# Patient Record
Sex: MALE | Race: OTHER
[De-identification: names, ages, dates, MRNs, and addresses within clinical notes are randomized per-mention and may not be internally consistent; named-entity substitution may affect disease eponyms.]

---

## 2018-07-13 ENCOUNTER — HOSPITAL ENCOUNTER (EMERGENCY)
Dept: HOSPITAL 41 - JD.ED | Age: 43
Discharge: HOME | End: 2018-07-13
Payer: MEDICAID

## 2018-07-13 DIAGNOSIS — F17.210: ICD-10-CM

## 2018-07-13 DIAGNOSIS — F41.9: Primary | ICD-10-CM
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
## 2018-07-16 NOTE — CR
Chest: Two views of the chest were obtained.

 

Comparison: No prior chest x-ray.

 

Heart size and mediastinum are normal.  Lungs are clear.  Bony 

structures are unremarkable.  Surgical clips are seen in the upper 

abdomen.

 

Impression:

1.  Nothing acute is seen on two-view chest x-ray.

 

Diagnostic code #1

## 2018-08-27 ENCOUNTER — HOSPITAL ENCOUNTER (EMERGENCY)
Facility: HOSPITAL | Age: 43
Discharge: HOME | End: 2018-08-27
Attending: FAMILY MEDICINE
Payer: COMMERCIAL

## 2018-08-27 VITALS
HEIGHT: 69 IN | HEART RATE: 77 BPM | RESPIRATION RATE: 16 BRPM | BODY MASS INDEX: 26.66 KG/M2 | OXYGEN SATURATION: 97 % | SYSTOLIC BLOOD PRESSURE: 130 MMHG | WEIGHT: 180 LBS | DIASTOLIC BLOOD PRESSURE: 81 MMHG | TEMPERATURE: 97.7 F

## 2018-08-27 DIAGNOSIS — R07.89 OTHER CHEST PAIN: ICD-10-CM

## 2018-08-27 DIAGNOSIS — R42 DIZZINESS: Primary | ICD-10-CM

## 2018-08-27 LAB
ALBUMIN SERPL-MCNC: 4.3 G/DL (ref 3.5–5.3)
ALP SERPL-CCNC: 104 U/L (ref 45–115)
ALT SERPL-CCNC: 40 U/L (ref 0–52)
ANION GAP SERPL CALC-SCNC: 7 MMOL/L (ref 3–11)
AST SERPL-CCNC: 17 U/L (ref 0–39)
BILIRUB SERPL-MCNC: 0.45 MG/DL (ref 0–1.4)
BUN SERPL-MCNC: 16 MG/DL (ref 7–25)
CALCIUM ALBUM COR SERPL-MCNC: 9.3 MG/DL (ref 8.5–10.1)
CALCIUM SERPL-MCNC: 9.5 MG/DL (ref 8.6–10.3)
CHLORIDE SERPL-SCNC: 101 MMOL/L (ref 98–107)
CO2 SERPL-SCNC: 27 MMOL/L (ref 21–32)
CREAT SERPL-MCNC: 1.2 MG/DL (ref 0.8–1.3)
ERYTHROCYTE [DISTWIDTH] IN BLOOD BY AUTOMATED COUNT: 14.6 % (ref 11.5–15)
GFR SERPL CREATININE-BSD FRML MDRD: 66 ML/MIN/1.73M*2
GLUCOSE SERPL-MCNC: 146 MG/DL (ref 70–105)
HCT VFR BLD AUTO: 45.2 % (ref 38–50)
HGB BLD-MCNC: 15.3 G/DL (ref 13.2–17.2)
MCH RBC QN AUTO: 28.7 PG (ref 29–34)
MCHC RBC AUTO-ENTMCNC: 33.9 G/DL (ref 32–36)
MCV RBC AUTO: 84.8 FL (ref 82–97)
PLATELET # BLD AUTO: 262 10*3/UL (ref 130–350)
PMV BLD AUTO: 8.2 FL (ref 6.9–10.8)
POTASSIUM SERPL-SCNC: 3.5 MMOL/L (ref 3.5–5.1)
PROT SERPL-MCNC: 6.8 G/DL (ref 6–8.3)
RBC # BLD AUTO: 5.33 10*6/ΜL (ref 4.1–5.8)
SODIUM SERPL-SCNC: 135 MMOL/L (ref 135–145)
TROPONIN I SERPL-MCNC: <0.03 NG/ML
WBC # BLD AUTO: 8.6 10*3/UL (ref 3.7–9.6)

## 2018-08-27 PROCEDURE — 36415 COLL VENOUS BLD VENIPUNCTURE: CPT | Performed by: FAMILY MEDICINE

## 2018-08-27 PROCEDURE — 99283 EMERGENCY DEPT VISIT LOW MDM: CPT | Performed by: FAMILY MEDICINE

## 2018-08-27 PROCEDURE — 84484 ASSAY OF TROPONIN QUANT: CPT | Performed by: FAMILY MEDICINE

## 2018-08-27 PROCEDURE — 99284 EMERGENCY DEPT VISIT MOD MDM: CPT | Performed by: FAMILY MEDICINE

## 2018-08-27 PROCEDURE — 93010 ELECTROCARDIOGRAM REPORT: CPT | Mod: NC | Performed by: PREVENTIVE MEDICINE

## 2018-08-27 PROCEDURE — 93005 ELECTROCARDIOGRAM TRACING: CPT | Performed by: FAMILY MEDICINE

## 2018-08-27 PROCEDURE — 6370000100 HC RX 637 (ALT 250 FOR IP): Performed by: FAMILY MEDICINE

## 2018-08-27 PROCEDURE — 85027 COMPLETE CBC AUTOMATED: CPT | Performed by: FAMILY MEDICINE

## 2018-08-27 PROCEDURE — 80053 COMPREHEN METABOLIC PANEL: CPT | Performed by: FAMILY MEDICINE

## 2018-08-27 PROCEDURE — 2500000200 HC RX 250 WO HCPCS

## 2018-08-27 RX ORDER — MECLIZINE HYDROCHLORIDE 25 MG/1
25 TABLET ORAL ONCE
Status: COMPLETED | OUTPATIENT
Start: 2018-08-27 | End: 2018-08-27

## 2018-08-27 RX ORDER — MECLIZINE HYDROCHLORIDE 25 MG/1
25 TABLET ORAL 3 TIMES DAILY PRN
Qty: 30 TABLET | Refills: 0 | Status: SHIPPED | OUTPATIENT
Start: 2018-08-27 | End: 2018-09-06

## 2018-08-27 RX ORDER — ONDANSETRON 4 MG/1
TABLET, ORALLY DISINTEGRATING ORAL
Status: COMPLETED
Start: 2018-08-27 | End: 2018-08-27

## 2018-08-27 RX ORDER — ONDANSETRON 4 MG/1
4 TABLET, ORALLY DISINTEGRATING ORAL ONCE
Status: COMPLETED | OUTPATIENT
Start: 2018-08-27 | End: 2018-08-27

## 2018-08-27 RX ADMIN — ONDANSETRON 4 MG: 4 TABLET, ORALLY DISINTEGRATING ORAL at 06:11

## 2018-08-27 RX ADMIN — MECLIZINE HYDROCHLORIDE 25 MG: 25 TABLET ORAL at 06:49

## 2018-08-27 ASSESSMENT — ENCOUNTER SYMPTOMS
DIARRHEA: 0
NUMBNESS: 0
FATIGUE: 0
WEAKNESS: 1
LIGHT-HEADEDNESS: 1
ACTIVITY CHANGE: 0
SPEECH DIFFICULTY: 0
COUGH: 0
DIZZINESS: 1
CHEST TIGHTNESS: 1
SORE THROAT: 0
NECK PAIN: 0
HEADACHES: 0
CHILLS: 0
NAUSEA: 1
ABDOMINAL PAIN: 0
APPETITE CHANGE: 0
VOMITING: 0
DIFFICULTY URINATING: 0
FEVER: 0
SHORTNESS OF BREATH: 0
NERVOUS/ANXIOUS: 1

## 2018-08-27 ASSESSMENT — PAIN DESCRIPTION - DESCRIPTORS: DESCRIPTORS: PRESSURE
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
## 2018-12-24 ENCOUNTER — HOSPITAL ENCOUNTER (EMERGENCY)
Facility: HOSPITAL | Age: 43
Discharge: HOME | End: 2018-12-24
Attending: PHYSICIAN ASSISTANT | Admitting: PHYSICIAN ASSISTANT
Payer: COMMERCIAL

## 2018-12-24 ENCOUNTER — APPOINTMENT (OUTPATIENT)
Dept: RADIOLOGY | Facility: HOSPITAL | Age: 43
End: 2018-12-24
Attending: PHYSICIAN ASSISTANT
Payer: COMMERCIAL

## 2018-12-24 ENCOUNTER — HOSPITAL ENCOUNTER (OUTPATIENT)
Facility: HOSPITAL | Age: 43
Discharge: ANOTHER HOSPITAL | End: 2018-12-24
Payer: COMMERCIAL

## 2018-12-24 VITALS
RESPIRATION RATE: 18 BRPM | SYSTOLIC BLOOD PRESSURE: 130 MMHG | TEMPERATURE: 97.7 F | BODY MASS INDEX: 28.51 KG/M2 | HEART RATE: 86 BPM | HEIGHT: 69 IN | DIASTOLIC BLOOD PRESSURE: 84 MMHG | OXYGEN SATURATION: 93 % | WEIGHT: 192.46 LBS

## 2018-12-24 DIAGNOSIS — K21.9 GERD (GASTROESOPHAGEAL REFLUX DISEASE): ICD-10-CM

## 2018-12-24 DIAGNOSIS — R10.13 EPIGASTRIC PAIN: Primary | ICD-10-CM

## 2018-12-24 LAB
ALBUMIN SERPL-MCNC: 4 G/DL (ref 3.5–5.3)
ALP SERPL-CCNC: 93 U/L (ref 45–115)
ALT SERPL-CCNC: 44 U/L (ref 0–52)
ANION GAP SERPL CALC-SCNC: 8 MMOL/L (ref 3–11)
AST SERPL-CCNC: 24 U/L (ref 0–39)
BASOPHILS # BLD AUTO: 0.1 10*3/UL
BASOPHILS NFR BLD AUTO: 1 % (ref 0–2)
BILIRUB SERPL-MCNC: 0.5 MG/DL (ref 0–1.4)
BUN SERPL-MCNC: 19 MG/DL (ref 7–25)
CALCIUM ALBUM COR SERPL-MCNC: 9.1 MG/DL (ref 8.6–10.3)
CALCIUM SERPL-MCNC: 9.1 MG/DL (ref 8.6–10.3)
CHLORIDE SERPL-SCNC: 104 MMOL/L (ref 98–107)
CO2 SERPL-SCNC: 24 MMOL/L (ref 21–32)
CREAT SERPL-MCNC: 0.86 MG/DL (ref 0.7–1.3)
EOSINOPHIL # BLD AUTO: 0.1 10*3/UL
EOSINOPHIL NFR BLD AUTO: 2 % (ref 0–3)
ERYTHROCYTE [DISTWIDTH] IN BLOOD BY AUTOMATED COUNT: 14.5 % (ref 11.5–15)
ETHANOL SERPL-MCNC: <10 MG/DL (ref 0–10)
GFR SERPL CREATININE-BSD FRML MDRD: 105 ML/MIN/1.73M*2
GLUCOSE SERPL-MCNC: 143 MG/DL (ref 70–105)
HCT VFR BLD AUTO: 45.3 % (ref 38–50)
HGB BLD-MCNC: 15.6 G/DL (ref 13.2–17.2)
LYMPHOCYTES # BLD AUTO: 1.9 10*3/UL
LYMPHOCYTES NFR BLD AUTO: 25 % (ref 15–47)
MCH RBC QN AUTO: 29.3 PG (ref 29–34)
MCHC RBC AUTO-ENTMCNC: 34.5 G/DL (ref 32–36)
MCV RBC AUTO: 84.9 FL (ref 82–97)
MONOCYTES # BLD AUTO: 0.7 10*3/UL
MONOCYTES NFR BLD AUTO: 9 % (ref 5–13)
NEUTROPHILS # BLD AUTO: 4.8 10*3/UL
NEUTROPHILS NFR BLD AUTO: 63 % (ref 46–70)
PLATELET # BLD AUTO: 236 10*3/UL (ref 130–350)
PMV BLD AUTO: 8.2 FL (ref 6.9–10.8)
POTASSIUM SERPL-SCNC: 3.1 MMOL/L (ref 3.5–5.1)
PROT SERPL-MCNC: 6.8 G/DL (ref 6–8.3)
RBC # BLD AUTO: 5.33 10*6/ΜL (ref 4.1–5.8)
SODIUM SERPL-SCNC: 136 MMOL/L (ref 135–145)
TROPONIN I SERPL-MCNC: <0.03 NG/ML
WBC # BLD AUTO: 7.6 10*3/UL (ref 3.7–9.6)

## 2018-12-24 PROCEDURE — 2500000200 HC RX 250 WO HCPCS: Performed by: PHYSICIAN ASSISTANT

## 2018-12-24 PROCEDURE — 84484 ASSAY OF TROPONIN QUANT: CPT | Performed by: PHYSICIAN ASSISTANT

## 2018-12-24 PROCEDURE — 99283 EMERGENCY DEPT VISIT LOW MDM: CPT | Performed by: PHYSICIAN ASSISTANT

## 2018-12-24 PROCEDURE — 99285 EMERGENCY DEPT VISIT HI MDM: CPT | Performed by: PHYSICIAN ASSISTANT

## 2018-12-24 PROCEDURE — 96374 THER/PROPH/DIAG INJ IV PUSH: CPT

## 2018-12-24 PROCEDURE — A0390 ADVANCED LIFE SUPPORT MILEAG: HCPCS | Mod: SH,QN

## 2018-12-24 PROCEDURE — 93010 ELECTROCARDIOGRAM REPORT: CPT | Performed by: FAMILY MEDICINE

## 2018-12-24 PROCEDURE — 99283 EMERGENCY DEPT VISIT LOW MDM: CPT | Mod: GF | Performed by: PHYSICIAN ASSISTANT

## 2018-12-24 PROCEDURE — 85025 COMPLETE CBC W/AUTO DIFF WBC: CPT | Performed by: PHYSICIAN ASSISTANT

## 2018-12-24 PROCEDURE — A0427 ALS1-EMERGENCY: HCPCS | Mod: SH,QN

## 2018-12-24 PROCEDURE — 2590000100 HC RX 259: Performed by: PHYSICIAN ASSISTANT

## 2018-12-24 PROCEDURE — 6360000200 HC RX 636 W HCPCS (ALT 250 FOR IP): Performed by: PHYSICIAN ASSISTANT

## 2018-12-24 PROCEDURE — 36415 COLL VENOUS BLD VENIPUNCTURE: CPT | Performed by: PHYSICIAN ASSISTANT

## 2018-12-24 PROCEDURE — G0480 DRUG TEST DEF 1-7 CLASSES: HCPCS | Performed by: PHYSICIAN ASSISTANT

## 2018-12-24 PROCEDURE — 93005 ELECTROCARDIOGRAM TRACING: CPT | Performed by: PHYSICIAN ASSISTANT

## 2018-12-24 PROCEDURE — 80320 DRUG SCREEN QUANTALCOHOLS: CPT | Performed by: PHYSICIAN ASSISTANT

## 2018-12-24 PROCEDURE — 80053 COMPREHEN METABOLIC PANEL: CPT | Performed by: PHYSICIAN ASSISTANT

## 2018-12-24 PROCEDURE — 71045 X-RAY EXAM CHEST 1 VIEW: CPT

## 2018-12-24 RX ORDER — ONDANSETRON HYDROCHLORIDE 2 MG/ML
4 INJECTION, SOLUTION INTRAVENOUS ONCE
Status: COMPLETED | OUTPATIENT
Start: 2018-12-24 | End: 2018-12-24

## 2018-12-24 RX ORDER — NAPROXEN SODIUM 220 MG/1
TABLET, FILM COATED ORAL
Status: DISCONTINUED
Start: 2018-12-24 | End: 2018-12-24 | Stop reason: HOSPADM

## 2018-12-24 RX ORDER — NAPROXEN SODIUM 220 MG/1
324 TABLET, FILM COATED ORAL ONCE
Status: COMPLETED | OUTPATIENT
Start: 2018-12-24 | End: 2018-12-24

## 2018-12-24 RX ORDER — ONDANSETRON HYDROCHLORIDE 2 MG/ML
INJECTION, SOLUTION INTRAVENOUS
Status: DISCONTINUED
Start: 2018-12-24 | End: 2018-12-24 | Stop reason: HOSPADM

## 2018-12-24 RX ORDER — ALUMINUM HYDROXIDE, MAGNESIUM HYDROXIDE, AND SIMETHICONE 1200; 120; 1200 MG/30ML; MG/30ML; MG/30ML
SUSPENSION ORAL
Status: DISCONTINUED
Start: 2018-12-24 | End: 2018-12-24 | Stop reason: HOSPADM

## 2018-12-24 RX ADMIN — NAPROXEN SODIUM 324 MG: 220 TABLET, FILM COATED ORAL at 01:30

## 2018-12-24 RX ADMIN — LIDOCAINE HYDROCHLORIDE 45 ML: 20 SOLUTION ORAL; TOPICAL at 01:35

## 2018-12-24 RX ADMIN — ONDANSETRON 4 MG: 2 INJECTION INTRAMUSCULAR; INTRAVENOUS at 01:35

## 2018-12-24 ASSESSMENT — PAIN DESCRIPTION - DESCRIPTORS
DESCRIPTORS: SHARP
DESCRIPTORS: SHARP

## 2018-12-24 ASSESSMENT — ENCOUNTER SYMPTOMS
NEUROLOGICAL NEGATIVE: 1
MUSCULOSKELETAL NEGATIVE: 1
COUGH: 1

## 2018-12-24 NOTE — DISCHARGE INSTRUCTIONS
Use omeprazole and TUMs over the counter for epigastric pain  Follow up with primary care provider in home town to recheck today's complaints  If condition persists or worsens to return to closest ER.
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
## 2018-12-24 NOTE — ED PROVIDER NOTES
HPI:  Chief Complaint   Patient presents with   • Chest Pain       This is a 43-year-old male who was brought in by EMS after waking up complaining of epigastric/ abdominal pain without any radiation to the back or arms .  This pain started at approximately 0045 and call the ambulance..  Patient said he just finished a course of amoxicillin for H. pylori which took 21 days.  Patient said he is has a little bit of nausea but no vomiting.  Patient said he was sleeping when the pain woke him up.  Patient denies any diabetes, hypertension, smoker or cardiac disease.  Patient is traveling through on his way to [facility] to see his family over the holidays.  Patient's last meal was earlier this evening where he had a slice of pizza and 3 glasses of water.  Patient denies any shortness of breath, headache, or blurred vision.  .She has no family history of cardiac disease.  Patient is otherwise healthy individual and denies any tobacco use.  Said he started feeling a little bit ill yesterday morning without any nausea, vomiting or epigastric pain.  Appetite has been decreased,however, he continues to gain weight.            HISTORY:  Past Medical History:   Diagnosis Date   • Diabetes mellitus (CMS/HCC) (HCC)    • H. pylori infection    • Meniere disease 2012       Past Surgical History:   Procedure Laterality Date   • CHOLECYSTECTOMY         Family History   Problem Relation Age of Onset   • Diabetes Father    • Stroke Father        Social History     Tobacco Use   • Smoking status: Current Some Day Smoker   • Smokeless tobacco: Never Used   Substance Use Topics   • Alcohol use: No   • Drug use: Defer         ROS:  Review of Systems   HENT: Positive for congestion.    Respiratory: Positive for cough.    Cardiovascular: Positive for chest pain.        Epigastric pain without radiation to the back or arms.   Gastrointestinal:        Epigastric pain with abdominal pain, exacerbated by coughing.    History of  H. pylori in his treatment yesterday with a 21-day of amoxicillin   Genitourinary: Negative.    Musculoskeletal: Negative.    Skin: Negative.    Neurological: Negative.        PE:  ED Triage Vitals   Temp Heart Rate Resp BP SpO2   12/24/18 0123 12/24/18 0123 12/24/18 0123 12/24/18 0123 12/24/18 0123   36.7 °C (98.1 °F) 91 16 149/91 96 %      Temp Source Heart Rate Source Patient Position BP Location FiO2 (%)   12/24/18 0123 -- 12/24/18 0140 -- --   Oral  Head of bed 30 degrees or higher         Physical Exam   Constitutional: He is oriented to person, place, and time. He appears well-developed and well-nourished. He does not appear ill.   HENT:   Head: Normocephalic and atraumatic.   Eyes: EOM are normal. Pupils are equal, round, and reactive to light.   Neck: Normal range of motion. Neck supple. No JVD present. No thyromegaly present.   Cardiovascular: Normal rate, regular rhythm, intact distal pulses and normal pulses.   Pulmonary/Chest: Effort normal and breath sounds normal.   Abdominal: Soft. Bowel sounds are normal. He exhibits no distension and no mass. There is no tenderness. There is no rebound and no guarding.   Musculoskeletal: Normal range of motion.        Right lower leg: Normal. He exhibits no tenderness and no edema.        Left lower leg: Normal. He exhibits no tenderness and no edema.   Neurological: He is alert and oriented to person, place, and time.   Skin: Skin is warm and dry. Capillary refill takes less than 2 seconds.   Psychiatric: He has a normal mood and affect. His behavior is normal.   Nursing note and vitals reviewed.      ED LABS:  Labs Reviewed   COMPREHENSIVE METABOLIC PANEL - Abnormal       Result Value    Sodium 136      Potassium 3.1 (*)     Chloride 104      CO2 24      Anion Gap 8      BUN 19      Creatinine 0.86      Glucose 143 (*)     Calcium 9.1      AST 24      ALT (SGPT) 44      Alkaline Phosphatase 93      Total Protein 6.8      Albumin 4.0      Total Bilirubin 0.50       eGFR 105      Corrected Calcium 9.1      Narrative:     Estimated GFR calculated using the 2009 CKD-EPI creatinine equation.   TROPONIN I - Normal    Troponin I <0.030     ETHANOL - Normal    Ethanol Lvl <10      Narrative:     This blood alcohol is for medical use only.   80 mg/dL is legally intoxicated.    CBC WITH AUTO DIFFERENTIAL    WBC 7.6      RBC 5.33      Hemoglobin 15.6      Hematocrit 45.3      MCV 84.9      MCH 29.3      MCHC 34.5      RDW 14.5      Platelets 236      MPV 8.2      Neutrophils% 63      Lymphocytes% 25      Monocytes% 9      Eosinophils% 2      Basophils% 1      Neutrophils Absolute 4.80      Lymphocytes Absolute 1.90      Monocytes Absolute 0.70      Eosinophils Absolute 0.10      Basophils Absolute 0.10           ED IMAGES:  XR chest portable 1 view   ED Interpretation   CXR appears WNL          ED PROCEDURES:  Procedures    ED COURSE:            MDM:  MDM  Number of Diagnoses or Management Options  Epigastric pain:   GERD (gastroesophageal reflux disease):   Diagnosis management comments: This is a 43-year-old male who suddenly woke up complaining of epigastric pain without radiation to the back or arms.  Patient called 911 was transported to our emergency room by ambulance.  Patient said he just finished a course for H. pylori with his last amoxicillin yesterday.  Patient said he has some mild nausea without vomiting.  Patient denies any diabetes, cardiac disease, smoking, EtOH use, hypertension or headache.  Patient did not take any medication to try to relieve the pain.  Patient rates pain a 7/10 sharp and sudden , like someone hit him in the chest.    EKG was done.  EKG shows a normal sinus rhythm with a rate of 80 no ST segment elevation. Labs to include a CBC, CMP, and troponin    IV has been established.  ASA 324mg chewables given on arrival.  GI cocktail administered and pain is now resolved.  Pain is now 0/10    Results for orders placed or performed during the hospital  encounter of 12/24/18  -CBC w/auto differential Blood, Venous       Result                      Value             Ref Range           WBC                         7.6               3.7 - 9.6 10*       RBC                         5.33              4.10 - 5.80 *       Hemoglobin                  15.6              13.2 - 17.2 *       Hematocrit                  45.3              38.0 - 50.0 %       MCV                         84.9              82.0 - 97.0 *       MCH                         29.3              29.0 - 34.0 *       MCHC                        34.5              32.0 - 36.0 *       RDW                         14.5              11.5 - 15.0 %       Platelets                   236               130 - 350 10*       MPV                         8.2               6.9 - 10.8 fL       Neutrophils%                63                46 - 70 %           Lymphocytes%                25                15 - 47 %           Monocytes%                  9                 5 - 13 %            Eosinophils%                2                 0 - 3 %             Basophils%                  1                 0 - 2 %             Neutrophils Absolute        4.80              10*3/uL             Lymphocytes Absolute        1.90              10*3/uL             Monocytes Absolute          0.70              10*3/uL             Eosinophils Absolute        0.10              10*3/uL             Basophils Absolute          0.10              10*3/uL        -Comprehensive metabolic panel Blood, Venous       Result                      Value             Ref Range           Sodium                      136               135 - 145 mm*       Potassium                   3.1 (L)           3.5 - 5.1 mm*       Chloride                    104               98 - 107 mmo*       CO2                         24                21 - 32 mmol*       Anion Gap                   8                 3 - 11 mmol/L       BUN                         19                7 - 25  mg/dL        Creatinine                  0.86              0.70 - 1.30 *       Glucose                     143 (H)           70 - 105 mg/*       Calcium                     9.1               8.6 - 10.3 m*       AST                         24                0 - 39 U/L          ALT (SGPT)                  44                0 - 52 U/L          Alkaline Phosphatase        93                45 - 115 U/L        Total Protein               6.8               6.0 - 8.3 g/*       Albumin                     4.0               3.5 - 5.3 g/*       Total Bilirubin             0.50              0.00 - 1.40 *       eGFR                        105               >60 mL/min/1*       Corrected Calcium           9.1               8.6 - 10.3 m*  -Troponin I Blood, Venous       Result                      Value             Ref Range           Troponin I                  <0.030            <0.030 ng/mL   -Alcohol Blood, Venous       Result                      Value             Ref Range           Ethanol Lvl                 <10               0 - 10 mg/dL      Potassium is 3.1 and Glu is 143. Rest of labs are WNL.  ETOH <10.    XR chest portable 1 view   ED Interpretation    CXR appears WNL, pending radiology report    I discussed all labs and CXR with patient.  I will discharge to home in stable condition. Recommend omeprazole otc and TUMs prn epigastric pain. When returns to home town [facility] to f/u with PCP for further evaluation and treatment of Hypokalemia and elevated BS, along with epigastric pain  Pt is discharged to home in stable condition to follow up with his PCP.              Amount and/or Complexity of Data Reviewed  Clinical lab tests: reviewed  Tests in the radiology section of CPT®: ordered and reviewed  Independent visualization of images, tracings, or specimens: yes    Patient Progress  Patient progress: stable      Final diagnoses:   [R10.13] Epigastric pain   [K21.9] GERD (gastroesophageal reflux disease)        Charo  LIOR Teresa  12/24/18 0219